# Patient Record
Sex: MALE | Race: WHITE | Employment: OTHER | URBAN - METROPOLITAN AREA
[De-identification: names, ages, dates, MRNs, and addresses within clinical notes are randomized per-mention and may not be internally consistent; named-entity substitution may affect disease eponyms.]

---

## 2018-11-02 ENCOUNTER — HOSPITAL ENCOUNTER (EMERGENCY)
Facility: HOSPITAL | Age: 48
Discharge: HOME | End: 2018-11-02
Attending: PEDIATRICS
Payer: COMMERCIAL

## 2018-11-02 VITALS
SYSTOLIC BLOOD PRESSURE: 139 MMHG | BODY MASS INDEX: 23.04 KG/M2 | HEART RATE: 70 BPM | TEMPERATURE: 98 F | DIASTOLIC BLOOD PRESSURE: 90 MMHG | RESPIRATION RATE: 18 BRPM | HEIGHT: 68 IN | WEIGHT: 152 LBS | OXYGEN SATURATION: 100 %

## 2018-11-02 DIAGNOSIS — S01.81XA FACIAL LACERATION, INITIAL ENCOUNTER: Primary | ICD-10-CM

## 2018-11-02 PROCEDURE — 12011 RPR F/E/E/N/L/M 2.5 CM/<: CPT

## 2018-11-02 PROCEDURE — 90471 IMMUNIZATION ADMIN: CPT

## 2018-11-02 PROCEDURE — 99283 EMERGENCY DEPT VISIT LOW MDM: CPT

## 2018-11-02 NOTE — ED PROVIDER NOTES
Patient Seen in: [facility] Emergency Department    History   Patient presents with:  Laceration Abrasion (integumentary)    Stated Complaint: ran into pipe, laceration to face, no vision problems    HPI    Patient is a 41-year-old male here with 2 throughout. Extremities: Warm and well perfused. Dermatologic exam: There are 2 small crescent-shaped lacerations each measuring about 1 cm with one above the left eyebrow and one just below the left eye. There is no active bleeding.   The one below the

## 2024-07-26 ENCOUNTER — HOSPITAL ENCOUNTER (OUTPATIENT)
Dept: ULTRASOUND IMAGING | Age: 54
Discharge: HOME | End: 2024-07-26
Attending: EMERGENCY MEDICINE
Payer: COMMERCIAL

## 2024-07-26 DIAGNOSIS — N50.89 TESTICULAR MASS: ICD-10-CM

## 2024-07-26 PROCEDURE — 93975 VASCULAR STUDY: CPT | Performed by: EMERGENCY MEDICINE

## 2024-07-26 PROCEDURE — 76870 US EXAM SCROTUM: CPT | Performed by: EMERGENCY MEDICINE

## 2024-08-01 NOTE — PROGRESS NOTES
Urology Clinic Note - New Patient    Referring Provider:  No referring provider defined for this encounter.     Primary Care Provider:  No primary care provider on file.     Chief Complaint:     Epididymal cyst    HPI:     Angel Rincon is a 54 year old otherwise healthy male here with epidydmal cyst.      Patient recently saw PCP and reported feeling an abnormality in the left hemiscrotum.  An ultrasound was done as below.  He has no scrotal pain.  He is asymptomatic from this.  He has no family history of  malignancy.  He has no history of gross hematuria.  He does report slight voiding symptoms, AUA symptom score is 10.  He has a slow stream, frequency, urgency.  Most of the time his overactive symptoms are related to fluid intake including water, morning coffee or preworkout.  He is slightly bothered by the slow stream.  PSA per PCP normal.     FINDINGS:    TESTES:  Testicles demonstrate some mac trick (?symmetric) echotexture without evidence of mass lesion.  There is symmetric flow to both testicles.  There is normal arterial and venous flow to both testicles.   EPIDIDYMIS:  There is epididymal head cyst on the right measuring 7 mm and 1 on the left measuring 2 mm.  The epididymal tail on the left demonstrates a cyst corresponds to the palpable abnormality measuring 16 x 17 x 20 mm.  The cyst is simple.    HYDROCELE:  There are bilateral moderate size simple hydroceles.   VARICOCELE:  There is a left-sided varicocele.   OTHER:  Negative.           Impression  CONCLUSION:       1. No evidence of testicular mass or inflammation.   2. Palpable abnormality corresponds to a simple cyst involving the left epididymal tail.     UA NEGATIVE   PVR 47cc    PSA:  No results found for: \"PSA\", \"PERCENTPSA\", \"PSAS\", \"PSAULTRA\", \"QPSA\", \"PSATOT\", \"TOTPSADX\", \"TOTPSASCREEN\"   Last Cr was 0.81 done on 3/8/2015.      History:     Past Medical History:    Collar bone fracture     History of lipomas; has had surgery before    Past Surgical History:   Procedure Laterality Date    Other surgical history  surgery in 1995 for a left broken arm       Family History   Problem Relation Age of Onset    Cancer Mother         lung ca    Diabetes Father     Cancer Maternal Grandfather     Cancer Paternal Grandfather        Social History     Socioeconomic History    Marital status:    Tobacco Use    Smoking status: Never    Smokeless tobacco: Never       Medications (Active prior to today's visit):  Current Outpatient Medications   Medication Sig Dispense Refill    Ibuprofen (IBU OR) Take 2 tablets by mouth daily as needed.         Allergies:  No Known Allergies      Review of Systems:   A comprehensive 10-point review of systems was completed.  Pertinent positives and negatives are noted in the the HPI.    Physical Exam:   CONSTITUTIONAL: Well developed, well nourished, in no acute distress  NEUROLOGIC: Alert and oriented  HEAD: Normocephalic, atraumatic  ENT: Hearing intact   RESPIRATORY: Normal respiratory effort  SKIN: No evident rashes  ABDOMEN: Soft, non-tender, non-distended  GENITOURINARY: Normal phallus, orthotopic meatus, normal bilateral testicles; small ~2mm left epidiymal cyst; non tender; trace bilateral hydrocele     US SCROTUM W/ DOPPLER (CPT=93975/68755)    Result Date: 7/26/2024  CONCLUSION:   1. No evidence of testicular mass or inflammation.  2. Palpable abnormality corresponds to a simple cyst involving the left epididymal tail.   LOCATION:  [facility]    Dictated by (CST): Minesh Siu MD on 7/26/2024 at 12:20 PM     Finalized by (CST): Minesh Siu MD on 7/26/2024 at 12:23 PM          Assessment & Plan:     # Epididymal cyst  Discussed ultrasound findings which shows a left varicocele, bilateral epididymal cyst, bilateral small hydroceles.  His exam is reassuring.  He has no symptoms from this.  We discussed options in detail and he would like to observe for now.  If any symptoms do develop we discussed ibuprofen for  pain and scrotal support.    # LUTS   Discussed symptoms in detail.  We discussed behavior modification.  We discussed cutting down caffeine, timed voiding.  We also discussed the option for adding Flomax due to his slow stream.  We discussed the side effect profile in detail.  He would like to trial.  Prescription sent.    Return for my      Thank you for this consult.    I have personally reviewed all relevant medical records, labs, and imaging.       Kunal Alvares MD  Staff Urologist  [facility]  Office: [phone]

## 2024-08-02 ENCOUNTER — OFFICE VISIT (OUTPATIENT)
Dept: SURGERY | Facility: CLINIC | Age: 54
End: 2024-08-02

## 2024-08-02 DIAGNOSIS — N40.1 BENIGN LOCALIZED PROSTATIC HYPERPLASIA WITH LOWER URINARY TRACT SYMPTOMS (LUTS): Primary | ICD-10-CM

## 2024-08-02 DIAGNOSIS — N50.3 EPIDIDYMAL CYST: ICD-10-CM

## 2024-08-02 LAB
APPEARANCE: CLEAR
BILIRUBIN: NEGATIVE
GLUCOSE (URINE DIPSTICK): NEGATIVE MG/DL
KETONES (URINE DIPSTICK): NEGATIVE MG/DL
LEUKOCYTES: NEGATIVE
MULTISTIX LOT#: NORMAL NUMERIC
NITRITE, URINE: NEGATIVE
OCCULT BLOOD: NEGATIVE
PH, URINE: 7 (ref 4.5–8)
PROTEIN (URINE DIPSTICK): NEGATIVE MG/DL
SPECIFIC GRAVITY: 1.01 (ref 1–1.03)
URINE-COLOR: YELLOW
UROBILINOGEN,SEMI-QN: 0.2 MG/DL (ref 0–1.9)

## 2024-08-02 PROCEDURE — 99244 OFF/OP CNSLTJ NEW/EST MOD 40: CPT | Performed by: UROLOGY

## 2024-08-02 PROCEDURE — 81003 URINALYSIS AUTO W/O SCOPE: CPT | Performed by: UROLOGY

## 2024-08-02 PROCEDURE — 51798 US URINE CAPACITY MEASURE: CPT | Performed by: UROLOGY

## 2024-08-02 RX ORDER — TAMSULOSIN HYDROCHLORIDE 0.4 MG/1
0.4 CAPSULE ORAL EVERY EVENING
Qty: 90 CAPSULE | Refills: 6 | Status: SHIPPED | OUTPATIENT
Start: 2024-08-02

## (undated) NOTE — ED AVS SNAPSHOT
Rod Cleveland   MRN: [id number]    Department:  [facility] Emergency Department   Date of Visit:  11/2/2018           Disclosure     Insurance plans vary and the physician(s) referred by the ER may not be covered by your plan.  Please contact tell this physician (or your personal doctor if your instructions are to return to your personal doctor) about any new or lasting problems. The primary care or specialist physician will see patients referred from the [facility] Emergency Department.  Kamar Castañeda